# Patient Record
Sex: FEMALE | Race: WHITE | URBAN - METROPOLITAN AREA
[De-identification: names, ages, dates, MRNs, and addresses within clinical notes are randomized per-mention and may not be internally consistent; named-entity substitution may affect disease eponyms.]

---

## 2023-01-19 ENCOUNTER — TELEPHONE (OUTPATIENT)
Dept: INTERNAL MEDICINE CLINIC | Age: 73
End: 2023-01-19

## 2023-01-19 NOTE — TELEPHONE ENCOUNTER
Patient called via the [address] [place] [address],[address] to state her insurance has told her that they reached out to Dr. Nelly Anguiano and she has agreed to accept her as a new patient. Please verify if this is so. Patient also states her  will be getting a new insurance card in the mail and on it Dr. Nelly Anguiano will be listed as his PCP. She would like to know will Dr. Nelly Anguiano accept him as well?

## 2023-01-30 NOTE — TELEPHONE ENCOUNTER
[facility] called and spoke with patient to let her know that the doctor is not taking new patients at this time.